# Patient Record
Sex: FEMALE | Race: BLACK OR AFRICAN AMERICAN | NOT HISPANIC OR LATINO
[De-identification: names, ages, dates, MRNs, and addresses within clinical notes are randomized per-mention and may not be internally consistent; named-entity substitution may affect disease eponyms.]

---

## 2020-05-30 ENCOUNTER — RESULT REVIEW (OUTPATIENT)
Age: 65
End: 2020-05-30

## 2020-11-10 ENCOUNTER — APPOINTMENT (OUTPATIENT)
Dept: NEUROLOGY | Facility: CLINIC | Age: 65
End: 2020-11-10
Payer: MEDICARE

## 2020-11-10 VITALS
OXYGEN SATURATION: 92 % | BODY MASS INDEX: 44.35 KG/M2 | TEMPERATURE: 97.2 F | SYSTOLIC BLOOD PRESSURE: 126 MMHG | DIASTOLIC BLOOD PRESSURE: 82 MMHG | HEIGHT: 62 IN | HEART RATE: 71 BPM | WEIGHT: 241 LBS

## 2020-11-10 DIAGNOSIS — R56.9 UNSPECIFIED CONVULSIONS: ICD-10-CM

## 2020-11-10 PROCEDURE — 99204 OFFICE O/P NEW MOD 45 MIN: CPT

## 2020-11-10 RX ORDER — PREDNISONE 20 MG/1
20 TABLET ORAL
Qty: 6 | Refills: 0 | Status: DISCONTINUED | COMMUNITY
Start: 2020-08-03

## 2020-11-11 NOTE — HISTORY OF PRESENT ILLNESS
[FreeTextEntry1] : Bonny Woody is a 64 year old F with a PMHx of Type II DM, Neuropathy, Arthritis in her neck s/p ACDF, HTN, Anxiety, and Bipolar Disorder.\par \par Per the patient, she gets a throbbing muscle type pain in her right thigh, like someone punched her in the leg. She has trouble at night when sleeping, she finds herself shaking in her sleep. She has to move her leg to stop the shaking. She does not get an urge to move. She feels like she has to move when this happens. The area of her anterior thigh feels hot. She denies numbness/tingling or weakness. This started a few months ago. No trouble walking. No shaking during the day. This is always when she is sleeping. \par \par No headaches, no changes in vision. No trouble speaking/swallowing. No dizziness/lightheadedness. No LOC. She falls frequently, she fell last week and last month. She is either walking up the stairs or tripping over something when she falls. No trouble sleeping since taking seroquel. She has vivid dreams. She does talk in her sleep. No trouble with bowel/bladder function. Neck pain, and lower back pain are present. \par \par Zippers and buttons no trouble. She has trouble turning and adjusting in bed at night. \par No constipation. No trouble with urination. Memory is good. Mood is stable.\par \par Sense of smell is good. \par \par Family history: unremarkable\par Social history: retired, . No exercise. No alcohol. She smokes cigarettes, 4 a day. No recreational drugs. She lives in a community residence house.

## 2020-11-11 NOTE — DISCUSSION/SUMMARY
[FreeTextEntry1] : Bonny Woody is a 64 year old F with a PMHx of Type II DM, Neuropathy, Arthritis in her neck s/p ACDF, HTN, Anxiety, and Bipolar Disorder.\par \par The patient's history is concerning for possible seizure like activity while sleeping, however, suspicion is low. Cannot completely rule out periodic limb movements during sleep. Her story does not suggest a diagnosis of Restless legs syndrome at this time. Her R anterior thigh pain may be related to neuropathy from possible nerve compression in the groin. In addition, on examination she does have mild EPS symptoms, likely related to her antipsychotic medications and are not interfering with her ADLs. Further investigations are warranted to rule out seizure like activity.\par \par Plan:\par - 24 hour ambulatory EEG\par - MRI brain with and without contrast, seizure protocol\par - No AEDs recommended at this time\par - Will continue to monitor thigh pain\par \par RTC 1 month\par \par All questions were answered to her satisfaction. I spent 45 minutes with this patient. More than 50% of the visit were dedicated to review of treatment, therapeutic plan, and prognosis, and patient was counseled on coping and physical and emotional wellbeing.\par \par

## 2020-11-11 NOTE — PHYSICAL EXAM
[Person] : oriented to person [Place] : oriented to place [Time] : oriented to time [Concentration Intact] : normal concentrating ability [Comprehension] : comprehension intact [Cranial Nerves Optic (II)] : visual acuity intact bilaterally,  visual fields full to confrontation, pupils equal round and reactive to light [Cranial Nerves Oculomotor (III)] : extraocular motion intact [Cranial Nerves Trigeminal (V)] : facial sensation intact symmetrically [Cranial Nerves Facial (VII)] : face symmetrical [Cranial Nerves Vestibulocochlear (VIII)] : hearing was intact bilaterally [Cranial Nerves Glossopharyngeal (IX)] : tongue and palate midline [Cranial Nerves Accessory (XI - Cranial And Spinal)] : head turning and shoulder shrug symmetric [Cranial Nerves Hypoglossal (XII)] : there was no tongue deviation with protrusion [Motor Strength] : muscle strength was normal in all four extremities [Involuntary Movements] : no involuntary movements were seen [Paresis Pronator Drift Right-Sided] : no pronator drift on the right [Paresis Pronator Drift Left-Sided] : no pronator drift on the left [Sensation Tactile Decrease] : light touch was intact [Coordination - Dysmetria Impaired Finger-to-Nose Bilateral] : not present [Coordination - Dysmetria Impaired Heel-to-Shin Bilateral] : not present [1+] : Ankle jerk left 1+ [FreeTextEntry1] : Face was mildly masked with decreased blinking rate. Voice was normal. Posture was mildly stooped.\par Bradykinesia was mildly present, right more than left.\par No rigidity, no tremors.\par Gait was with short stride, no shuffling, no freezing of gait, aside from absent arm swing bilaterally. \par

## 2020-11-24 ENCOUNTER — OUTPATIENT (OUTPATIENT)
Dept: OUTPATIENT SERVICES | Facility: HOSPITAL | Age: 65
LOS: 1 days | End: 2020-11-24
Payer: MEDICARE

## 2020-11-24 DIAGNOSIS — Z98.89 OTHER SPECIFIED POSTPROCEDURAL STATES: Chronic | ICD-10-CM

## 2020-11-24 DIAGNOSIS — R56.9 UNSPECIFIED CONVULSIONS: ICD-10-CM

## 2020-11-24 PROCEDURE — 95812 EEG 41-60 MINUTES: CPT

## 2020-12-14 ENCOUNTER — APPOINTMENT (OUTPATIENT)
Dept: NEUROLOGY | Facility: CLINIC | Age: 65
End: 2020-12-14
Payer: MEDICARE

## 2020-12-23 ENCOUNTER — APPOINTMENT (OUTPATIENT)
Dept: MRI IMAGING | Facility: CLINIC | Age: 65
End: 2020-12-23
Payer: MEDICARE

## 2020-12-23 ENCOUNTER — OUTPATIENT (OUTPATIENT)
Dept: OUTPATIENT SERVICES | Facility: HOSPITAL | Age: 65
LOS: 1 days | End: 2020-12-23
Payer: MEDICARE

## 2020-12-23 ENCOUNTER — RESULT REVIEW (OUTPATIENT)
Age: 65
End: 2020-12-23

## 2020-12-23 DIAGNOSIS — R56.9 UNSPECIFIED CONVULSIONS: ICD-10-CM

## 2020-12-23 DIAGNOSIS — Z98.89 OTHER SPECIFIED POSTPROCEDURAL STATES: Chronic | ICD-10-CM

## 2020-12-23 PROCEDURE — 70553 MRI BRAIN STEM W/O & W/DYE: CPT

## 2020-12-23 PROCEDURE — A9585: CPT

## 2020-12-23 PROCEDURE — 70553 MRI BRAIN STEM W/O & W/DYE: CPT | Mod: 26

## 2021-01-04 ENCOUNTER — APPOINTMENT (OUTPATIENT)
Dept: NEUROLOGY | Facility: CLINIC | Age: 66
End: 2021-01-04
Payer: MEDICARE

## 2021-01-04 VITALS
BODY MASS INDEX: 42.2 KG/M2 | SYSTOLIC BLOOD PRESSURE: 126 MMHG | HEIGHT: 63 IN | DIASTOLIC BLOOD PRESSURE: 70 MMHG | WEIGHT: 238.19 LBS | TEMPERATURE: 97.2 F

## 2021-01-04 DIAGNOSIS — G47.8 OTHER SLEEP DISORDERS: ICD-10-CM

## 2021-01-04 PROCEDURE — 99214 OFFICE O/P EST MOD 30 MIN: CPT

## 2021-01-04 NOTE — DISCUSSION/SUMMARY
[FreeTextEntry1] : Bonny Woody is a 65 year old F with a PMHx of Type II DM, Neuropathy, Arthritis in her neck s/p ACDF and fusion in her lumbar spine, HTN, Anxiety, and Bipolar Disorder. She presents for follow up of tremors out of sleep.\par \par 24 hour ambulatory EEG and MRI brain with and without contrast were reviewed with the patient. No abnormalities were found. \par \par Cannot completely rule out periodic limb movements during sleep. Her story does not suggest a diagnosis of Restless legs syndrome at this time. She describes episodes that are concerning for possible sleep paralysis when she wakes up, in addition to mild RBD symptoms. \par \par Her R anterior thigh pain may be related to neuropathy vs. radiculopathy. In addition, on examination she does have mild EPS symptoms, likely related to her antipsychotic medications and are not interfering with her ADLs. \par \par Plan:\par - Increase Gabapentin to 600mg TID for possible neuropathic pain in the right thigh\par - Inpatient sleep study to further assess RDB symptoms and tremulousness on awakening. Will decide on treatment after this is completed.\par \par RTC 2 months\par \par All questions were answered to her satisfaction. I spent 25 minutes with this patient. More than 50% of the visit were dedicated to review of treatment, therapeutic plan, and prognosis, and patient was counseled on coping and physical and emotional wellbeing.\par \par

## 2021-01-04 NOTE — HISTORY OF PRESENT ILLNESS
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 She presents for follow up of tremors out of sleep.\par \par Interval history:\par Since the last visit, the patient reports that she has been doing well overall, however, she continues to have tremor episodes consistently. Her 24 hour ambulatory EEG and MRI brain were unremarkable. She does have vivid dreams of her mother. Every time she wakes up her body is trembling. She is unsure if the tremors are waking her up or if she starts shaking when she wakes up. The shaking lasts about 3-4 minutes. She kicks her foot out to stop it or stretches her arms out. She states that she feels as though she is stuck and she cannot move, and possibly this why she starts shaking. She cannot get up out of the bed at those times. She denies suddenly falling asleep during the day. No dizziness/lightheadedness. No LOC.\par \par She reports that she continues to have right thigh pain and bilateral knee pain. She is currently on Gabapentin 1 tab TID. \par \par Initial history:\par Per the patient, she gets a throbbing muscle type pain in her right thigh, like someone punched her in the leg. She has trouble at night when sleeping, she finds herself shaking in her sleep. She has to move her leg to stop the shaking. She does not get an urge to move. She feels like she has to move when this happens. The area of her anterior thigh feels hot. She denies numbness/tingling or weakness. This started a few months ago. No trouble walking. No shaking during the day. This is always when she is sleeping. \par \par No headaches, no changes in vision. No trouble speaking/swallowing. No dizziness/lightheadedness. No LOC. She falls frequently, she fell last week and last month. She is either walking up the stairs or tripping over something when she falls. No trouble sleeping since taking seroquel. She has vivid dreams. She does talk in her sleep. No trouble with bowel/bladder function. Neck pain, and lower back pain are present. \par \par Zippers and buttons no trouble. She has trouble turning and adjusting in bed at night. \par No constipation. No trouble with urination. Memory is good. Mood is stable.\par Sense of smell is good. \par \par Family history: unremarkable\par Social history: retired, . No exercise. No alcohol. She smokes cigarettes, 4 a day. No recreational drugs. She lives in a community residence house.

## 2021-01-04 NOTE — PHYSICAL EXAM
[Person] : oriented to person [Place] : oriented to place [Time] : oriented to time [Concentration Intact] : normal concentrating ability [Comprehension] : comprehension intact [Cranial Nerves Optic (II)] : visual acuity intact bilaterally,  visual fields full to confrontation, pupils equal round and reactive to light [Cranial Nerves Oculomotor (III)] : extraocular motion intact [Cranial Nerves Trigeminal (V)] : facial sensation intact symmetrically [Cranial Nerves Facial (VII)] : face symmetrical [Cranial Nerves Vestibulocochlear (VIII)] : hearing was intact bilaterally [Cranial Nerves Glossopharyngeal (IX)] : tongue and palate midline [Cranial Nerves Accessory (XI - Cranial And Spinal)] : head turning and shoulder shrug symmetric [Cranial Nerves Hypoglossal (XII)] : there was no tongue deviation with protrusion [Motor Strength] : muscle strength was normal in all four extremities [Involuntary Movements] : no involuntary movements were seen [Sensation Tactile Decrease] : light touch was intact [1+] : Ankle jerk left 1+ [Paresis Pronator Drift Right-Sided] : no pronator drift on the right [Paresis Pronator Drift Left-Sided] : no pronator drift on the left [Coordination - Dysmetria Impaired Finger-to-Nose Bilateral] : not present [Coordination - Dysmetria Impaired Heel-to-Shin Bilateral] : not present [FreeTextEntry1] : Face was mildly masked with decreased blinking rate. Voice was normal. Posture was mildly stooped.\par Bradykinesia was mildly present, right more than left.\par No rigidity, no tremors.\par Gait was with short stride, no shuffling, no freezing of gait, aside from absent arm swing bilaterally. \par

## 2021-02-04 ENCOUNTER — NON-APPOINTMENT (OUTPATIENT)
Age: 66
End: 2021-02-04

## 2021-02-04 ENCOUNTER — APPOINTMENT (OUTPATIENT)
Dept: THORACIC SURGERY | Facility: CLINIC | Age: 66
End: 2021-02-04
Payer: MEDICARE

## 2021-02-04 VITALS — HEIGHT: 63 IN | BODY MASS INDEX: 42.7 KG/M2 | WEIGHT: 241 LBS

## 2021-02-04 DIAGNOSIS — G21.19 OTHER DRUG INDUCED SECONDARY PARKINSONISM: ICD-10-CM

## 2021-02-04 DIAGNOSIS — F17.210 NICOTINE DEPENDENCE, CIGARETTES, UNCOMPLICATED: ICD-10-CM

## 2021-02-04 DIAGNOSIS — Z12.2 ENCOUNTER FOR SCREENING FOR MALIGNANT NEOPLASM OF RESPIRATORY ORGANS: ICD-10-CM

## 2021-02-04 PROCEDURE — G0296 VISIT TO DETERM LDCT ELIG: CPT

## 2021-02-08 NOTE — PLAN
[FreeTextEntry1] : Her LDCT is scheduled for 2/8/21 at the [facility] location.  She will follow up with Dr. Beck for the results.

## 2021-02-08 NOTE — HISTORY OF PRESENT ILLNESS
[TextBox_[id number]] : She denies hemoptysis, denies new cough, denies unexplained weight loss.\par She is a current smoker with a 30 pack year smoking history (0.6PPD x 50 years).  She denies family h/o lung cancer.  She is referred by Dr. Gregorio Beck.

## 2021-02-23 ENCOUNTER — APPOINTMENT (OUTPATIENT)
Dept: CT IMAGING | Facility: IMAGING CENTER | Age: 66
End: 2021-02-23
Payer: MEDICARE

## 2021-02-23 ENCOUNTER — OUTPATIENT (OUTPATIENT)
Dept: OUTPATIENT SERVICES | Facility: HOSPITAL | Age: 66
LOS: 1 days | End: 2021-02-23
Payer: MEDICARE

## 2021-02-23 DIAGNOSIS — Z98.89 OTHER SPECIFIED POSTPROCEDURAL STATES: Chronic | ICD-10-CM

## 2021-02-23 DIAGNOSIS — Z87.891 PERSONAL HISTORY OF NICOTINE DEPENDENCE: ICD-10-CM

## 2021-02-23 PROCEDURE — 71271 CT THORAX LUNG CANCER SCR C-: CPT | Mod: 26,MH

## 2021-02-23 PROCEDURE — 71271 CT THORAX LUNG CANCER SCR C-: CPT

## 2021-04-06 ENCOUNTER — APPOINTMENT (OUTPATIENT)
Dept: NEUROLOGY | Facility: CLINIC | Age: 66
End: 2021-04-06

## 2022-06-10 ENCOUNTER — APPOINTMENT (OUTPATIENT)
Dept: BARIATRICS | Facility: CLINIC | Age: 67
End: 2022-06-10